# Patient Record
Sex: FEMALE | Race: WHITE | NOT HISPANIC OR LATINO
[De-identification: names, ages, dates, MRNs, and addresses within clinical notes are randomized per-mention and may not be internally consistent; named-entity substitution may affect disease eponyms.]

---

## 2021-04-09 ENCOUNTER — EXTERNAL RECORD (OUTPATIENT)
Dept: HEALTH INFORMATION MANAGEMENT | Facility: OTHER | Age: 36
End: 2021-04-09

## 2022-01-01 ENCOUNTER — EXTERNAL RECORD (OUTPATIENT)
Dept: OTHER | Age: 37
End: 2022-01-01

## 2022-06-02 ENCOUNTER — OFFICE VISIT (OUTPATIENT)
Dept: OBGYN | Age: 37
End: 2022-06-02

## 2022-06-02 VITALS
HEART RATE: 104 BPM | DIASTOLIC BLOOD PRESSURE: 78 MMHG | RESPIRATION RATE: 17 BRPM | WEIGHT: 198.41 LBS | SYSTOLIC BLOOD PRESSURE: 125 MMHG | OXYGEN SATURATION: 100 %

## 2022-06-02 DIAGNOSIS — F32.81 PMDD (PREMENSTRUAL DYSPHORIC DISORDER): Primary | ICD-10-CM

## 2022-06-02 PROCEDURE — 99204 OFFICE O/P NEW MOD 45 MIN: CPT | Performed by: OBSTETRICS & GYNECOLOGY

## 2022-06-02 RX ORDER — VENLAFAXINE 37.5 MG/1
37.5 TABLET ORAL DAILY
COMMUNITY
Start: 2022-05-04 | End: 2022-06-02 | Stop reason: SDUPTHER

## 2022-06-02 RX ORDER — VENLAFAXINE 37.5 MG/1
37.5 TABLET ORAL DAILY
Qty: 90 TABLET | Refills: 3 | Status: ON HOLD | OUTPATIENT
Start: 2022-06-02 | End: 2023-11-08

## 2022-06-02 ASSESSMENT — ENCOUNTER SYMPTOMS
ABDOMINAL DISTENTION: 0
UNEXPECTED WEIGHT CHANGE: 0
SHORTNESS OF BREATH: 1
CHILLS: 0
ACTIVITY CHANGE: 0
DIAPHORESIS: 0
FEVER: 0
FATIGUE: 1
APPETITE CHANGE: 0

## 2022-06-02 ASSESSMENT — PAIN SCALES - GENERAL: PAINLEVEL: 0

## 2022-06-27 ENCOUNTER — V-VISIT (OUTPATIENT)
Dept: FAMILY MEDICINE | Age: 37
End: 2022-06-27

## 2022-06-27 DIAGNOSIS — U07.1 COVID-19 VIRUS INFECTION: ICD-10-CM

## 2022-06-27 DIAGNOSIS — J01.90 ACUTE BACTERIAL RHINOSINUSITIS: Primary | ICD-10-CM

## 2022-06-27 DIAGNOSIS — B96.89 ACUTE BACTERIAL RHINOSINUSITIS: Primary | ICD-10-CM

## 2022-06-27 PROCEDURE — 99214 OFFICE O/P EST MOD 30 MIN: CPT | Performed by: NURSE PRACTITIONER

## 2022-06-27 RX ORDER — GUAIFENESIN 600 MG/1
600 TABLET, EXTENDED RELEASE ORAL 2 TIMES DAILY
Qty: 20 TABLET | Refills: 0 | Status: SHIPPED | COMMUNITY
Start: 2022-06-27 | End: 2022-07-07

## 2022-06-27 RX ORDER — FLUTICASONE PROPIONATE 50 MCG
2 SPRAY, SUSPENSION (ML) NASAL DAILY
Qty: 1 EACH | Refills: 0 | Status: SHIPPED | OUTPATIENT
Start: 2022-06-27 | End: 2022-09-22 | Stop reason: ALTCHOICE

## 2022-06-27 RX ORDER — DOXYCYCLINE 100 MG/1
100 CAPSULE ORAL 2 TIMES DAILY
Qty: 14 CAPSULE | Refills: 0 | Status: SHIPPED | OUTPATIENT
Start: 2022-06-27 | End: 2022-07-04

## 2022-07-19 ENCOUNTER — TELEPHONE (OUTPATIENT)
Dept: CARDIOLOGY | Age: 37
End: 2022-07-19

## 2022-07-19 DIAGNOSIS — Z87.74 HISTORY OF CONGENITAL HEART DEFECT: Primary | ICD-10-CM

## 2022-08-17 ENCOUNTER — APPOINTMENT (OUTPATIENT)
Dept: CARDIOLOGY | Age: 37
End: 2022-08-17

## 2022-09-12 ENCOUNTER — TELEPHONE (OUTPATIENT)
Dept: OBGYN | Age: 37
End: 2022-09-12

## 2022-09-22 ENCOUNTER — OFFICE VISIT (OUTPATIENT)
Dept: OBGYN | Age: 37
End: 2022-09-22

## 2022-09-22 VITALS
SYSTOLIC BLOOD PRESSURE: 130 MMHG | WEIGHT: 203.37 LBS | HEIGHT: 70 IN | DIASTOLIC BLOOD PRESSURE: 86 MMHG | RESPIRATION RATE: 17 BRPM | BODY MASS INDEX: 29.12 KG/M2 | HEART RATE: 91 BPM | OXYGEN SATURATION: 93 %

## 2022-09-22 DIAGNOSIS — N63.21 MASS OF UPPER OUTER QUADRANT OF LEFT BREAST: Primary | ICD-10-CM

## 2022-09-22 PROCEDURE — 99214 OFFICE O/P EST MOD 30 MIN: CPT | Performed by: OBSTETRICS & GYNECOLOGY

## 2022-09-22 ASSESSMENT — PAIN SCALES - GENERAL: PAINLEVEL: 0

## 2022-10-03 ENCOUNTER — HOSPITAL ENCOUNTER (OUTPATIENT)
Dept: ULTRASOUND IMAGING | Age: 37
Discharge: HOME | End: 2022-10-03
Attending: OBSTETRICS & GYNECOLOGY

## 2022-10-03 ENCOUNTER — HOSPITAL ENCOUNTER (OUTPATIENT)
Dept: MAMMOGRAPHY | Age: 37
Discharge: HOME | End: 2022-10-03
Attending: OBSTETRICS & GYNECOLOGY

## 2022-10-03 DIAGNOSIS — N63.21 MASS OF UPPER OUTER QUADRANT OF LEFT BREAST: ICD-10-CM

## 2022-10-03 PROCEDURE — G0279 TOMOSYNTHESIS, MAMMO: HCPCS

## 2022-10-03 PROCEDURE — 76642 ULTRASOUND BREAST LIMITED: CPT

## 2022-10-05 PROBLEM — N64.4 PAIN OF LEFT BREAST: Status: ACTIVE | Noted: 2022-10-05

## 2023-01-04 ENCOUNTER — HOSPITAL ENCOUNTER (OUTPATIENT)
Age: 38
Discharge: HOME | End: 2023-01-04
Attending: EMERGENCY MEDICINE
Payer: COMMERCIAL

## 2023-01-04 VITALS
SYSTOLIC BLOOD PRESSURE: 117 MMHG | TEMPERATURE: 98 F | RESPIRATION RATE: 20 BRPM | OXYGEN SATURATION: 92 % | DIASTOLIC BLOOD PRESSURE: 74 MMHG | HEART RATE: 95 BPM

## 2023-01-04 DIAGNOSIS — J01.00 ACUTE NON-RECURRENT MAXILLARY SINUSITIS: Primary | ICD-10-CM

## 2023-01-04 LAB — SARS-COV-2 RNA RESP QL NAA+PROBE: NOT DETECTED

## 2023-01-04 PROCEDURE — 99203 OFFICE O/P NEW LOW 30 MIN: CPT

## 2023-01-04 RX ORDER — FLUTICASONE PROPIONATE 50 MCG
1 SPRAY, SUSPENSION (ML) NASAL 2 TIMES DAILY
Qty: 16 G | Refills: 0 | Status: SHIPPED | OUTPATIENT
Start: 2023-01-04 | End: 2023-01-11

## 2023-01-04 RX ORDER — VENLAFAXINE 37.5 MG/1
37.5 TABLET ORAL DAILY
COMMUNITY
Start: 2022-06-02 | End: 2023-06-02

## 2023-01-04 RX ORDER — AZITHROMYCIN 250 MG/1
TABLET, FILM COATED ORAL
Qty: 6 TABLET | Refills: 0 | Status: SHIPPED | OUTPATIENT
Start: 2023-01-04 | End: 2023-01-09

## 2023-04-04 ENCOUNTER — TELEPHONE (OUTPATIENT)
Dept: OBGYN | Age: 38
End: 2023-04-04

## 2023-04-04 DIAGNOSIS — R92.8 ABNORMAL MAMMOGRAM: Primary | ICD-10-CM

## 2023-07-21 ENCOUNTER — TELEPHONE (OUTPATIENT)
Dept: OBGYN | Age: 38
End: 2023-07-21

## 2023-07-31 ENCOUNTER — TELEPHONE (OUTPATIENT)
Dept: INTERNAL MEDICINE | Age: 38
End: 2023-07-31

## 2023-08-15 ENCOUNTER — E-ADVICE (OUTPATIENT)
Dept: OBGYN | Age: 38
End: 2023-08-15

## 2023-08-27 ENCOUNTER — HOSPITAL ENCOUNTER (OUTPATIENT)
Age: 38
Discharge: HOME | End: 2023-08-27
Attending: EMERGENCY MEDICINE
Payer: COMMERCIAL

## 2023-08-27 VITALS
OXYGEN SATURATION: 96 % | DIASTOLIC BLOOD PRESSURE: 81 MMHG | RESPIRATION RATE: 20 BRPM | HEART RATE: 99 BPM | SYSTOLIC BLOOD PRESSURE: 137 MMHG | TEMPERATURE: 99 F

## 2023-08-27 DIAGNOSIS — B34.9 VIRAL SYNDROME: Primary | ICD-10-CM

## 2023-08-27 LAB
POCT INFLUENZA A: NEGATIVE
POCT INFLUENZA B: NEGATIVE
SARS-COV-2 RNA RESP QL NAA+PROBE: NOT DETECTED

## 2023-08-27 PROCEDURE — 87502 INFLUENZA DNA AMP PROBE: CPT | Performed by: EMERGENCY MEDICINE

## 2023-08-27 PROCEDURE — 99212 OFFICE O/P EST SF 10 MIN: CPT

## 2023-08-27 PROCEDURE — 99213 OFFICE O/P EST LOW 20 MIN: CPT

## 2023-08-27 NOTE — ED INITIAL ASSESSMENT (HPI)
Patient states yesterday afternoon she began to feel ill.  + fatigue, nausea, chills and headache. States iud expires this month, has a appointment for removal next week.

## 2023-08-30 ENCOUNTER — OFFICE VISIT (OUTPATIENT)
Dept: OBGYN | Age: 38
End: 2023-08-30

## 2023-08-30 VITALS
DIASTOLIC BLOOD PRESSURE: 81 MMHG | HEART RATE: 97 BPM | RESPIRATION RATE: 16 BRPM | OXYGEN SATURATION: 100 % | SYSTOLIC BLOOD PRESSURE: 127 MMHG

## 2023-08-30 DIAGNOSIS — Z30.09 GENERAL COUNSELING AND ADVICE ON FEMALE CONTRACEPTION: Primary | ICD-10-CM

## 2023-08-30 PROCEDURE — 99213 OFFICE O/P EST LOW 20 MIN: CPT | Performed by: OBSTETRICS & GYNECOLOGY

## 2023-08-30 RX ORDER — ATOMOXETINE 25 MG/1
25 CAPSULE ORAL DAILY
COMMUNITY
Start: 2023-08-17 | End: 2023-11-03 | Stop reason: ALTCHOICE

## 2023-08-30 ASSESSMENT — ENCOUNTER SYMPTOMS
NAUSEA: 0
CONSTIPATION: 0
DIARRHEA: 0
ACTIVITY CHANGE: 0
APPETITE CHANGE: 1
UNEXPECTED WEIGHT CHANGE: 1

## 2023-08-30 ASSESSMENT — PAIN SCALES - GENERAL: PAINLEVEL: 0

## 2023-09-07 ENCOUNTER — ADMINISTRATIVE DOCUMENTATION (OUTPATIENT)
Dept: CT IMAGING | Age: 38
End: 2023-09-07

## 2023-09-11 ENCOUNTER — E-ADVICE (OUTPATIENT)
Dept: OTHER | Age: 38
End: 2023-09-11

## 2023-09-21 ENCOUNTER — OFFICE VISIT (OUTPATIENT)
Dept: OBGYN | Age: 38
End: 2023-09-21

## 2023-09-21 VITALS
DIASTOLIC BLOOD PRESSURE: 79 MMHG | BODY MASS INDEX: 29.32 KG/M2 | WEIGHT: 204.37 LBS | RESPIRATION RATE: 16 BRPM | OXYGEN SATURATION: 98 % | SYSTOLIC BLOOD PRESSURE: 125 MMHG | HEART RATE: 108 BPM

## 2023-09-21 DIAGNOSIS — Z12.4 CERVICAL CANCER SCREENING: ICD-10-CM

## 2023-09-21 DIAGNOSIS — Z30.430 ENCOUNTER FOR INSERTION OF MIRENA IUD: ICD-10-CM

## 2023-09-21 DIAGNOSIS — T83.39XA RETAINED INTRAUTERINE CONTRACEPTIVE DEVICE (IUD): ICD-10-CM

## 2023-09-21 DIAGNOSIS — Z30.433 ENCOUNTER FOR REMOVAL AND REINSERTION OF IUD: Primary | ICD-10-CM

## 2023-09-21 PROCEDURE — 87624 HPV HI-RISK TYP POOLED RSLT: CPT | Performed by: INTERNAL MEDICINE

## 2023-09-21 PROCEDURE — 58301 REMOVE INTRAUTERINE DEVICE: CPT | Performed by: OBSTETRICS & GYNECOLOGY

## 2023-09-21 PROCEDURE — 99213 OFFICE O/P EST LOW 20 MIN: CPT | Performed by: OBSTETRICS & GYNECOLOGY

## 2023-09-21 PROCEDURE — 76830 TRANSVAGINAL US NON-OB: CPT | Performed by: OBSTETRICS & GYNECOLOGY

## 2023-09-21 PROCEDURE — 88175 CYTOPATH C/V AUTO FLUID REDO: CPT | Performed by: INTERNAL MEDICINE

## 2023-09-21 ASSESSMENT — PATIENT HEALTH QUESTIONNAIRE - PHQ9
SUM OF ALL RESPONSES TO PHQ9 QUESTIONS 1 AND 2: 0
CLINICAL INTERPRETATION OF PHQ2 SCORE: NO FURTHER SCREENING NEEDED
1. LITTLE INTEREST OR PLEASURE IN DOING THINGS: NOT AT ALL
SUM OF ALL RESPONSES TO PHQ9 QUESTIONS 1 AND 2: 0
2. FEELING DOWN, DEPRESSED OR HOPELESS: NOT AT ALL

## 2023-09-21 ASSESSMENT — PAIN SCALES - GENERAL: PAINLEVEL: 0

## 2023-09-25 ENCOUNTER — PREP FOR CASE (OUTPATIENT)
Dept: OBGYN | Age: 38
End: 2023-09-25

## 2023-09-25 DIAGNOSIS — B97.7 HIGH RISK HPV INFECTION: ICD-10-CM

## 2023-09-25 DIAGNOSIS — T83.39XA RETAINED INTRAUTERINE CONTRACEPTIVE DEVICE (IUD): Primary | ICD-10-CM

## 2023-09-26 LAB
CASE RPRT: NORMAL
CLINICAL INFO: NORMAL
CYTOLOGY CVX/VAG STUDY: NORMAL
HPV16+18+45 E6+E7MRNA CVX NAA+PROBE: POSITIVE
Lab: ABNORMAL
PAP EDUCATIONAL NOTE: NORMAL
SPECIMEN ADEQUACY: NORMAL

## 2023-10-05 ENCOUNTER — TELEPHONE (OUTPATIENT)
Dept: OBGYN | Age: 38
End: 2023-10-05

## 2023-10-09 ENCOUNTER — TELEPHONE (OUTPATIENT)
Dept: ULTRASOUND IMAGING | Age: 38
End: 2023-10-09

## 2023-10-25 ENCOUNTER — E-ADVICE (OUTPATIENT)
Dept: OBGYN | Age: 38
End: 2023-10-25

## 2023-10-26 ENCOUNTER — APPOINTMENT (OUTPATIENT)
Dept: GENERAL RADIOLOGY | Age: 38
End: 2023-10-26
Attending: EMERGENCY MEDICINE
Payer: COMMERCIAL

## 2023-10-26 ENCOUNTER — HOSPITAL ENCOUNTER (OUTPATIENT)
Age: 38
Discharge: HOME | End: 2023-10-26
Attending: EMERGENCY MEDICINE
Payer: COMMERCIAL

## 2023-10-26 VITALS
TEMPERATURE: 99 F | SYSTOLIC BLOOD PRESSURE: 117 MMHG | RESPIRATION RATE: 20 BRPM | DIASTOLIC BLOOD PRESSURE: 90 MMHG | HEART RATE: 110 BPM | OXYGEN SATURATION: 95 %

## 2023-10-26 DIAGNOSIS — U07.1 COVID-19: Primary | ICD-10-CM

## 2023-10-26 LAB — SARS-COV-2 RNA RESP QL NAA+PROBE: DETECTED

## 2023-10-26 PROCEDURE — 99213 OFFICE O/P EST LOW 20 MIN: CPT

## 2023-10-26 PROCEDURE — 71046 X-RAY EXAM CHEST 2 VIEWS: CPT | Performed by: EMERGENCY MEDICINE

## 2023-10-26 PROCEDURE — 99214 OFFICE O/P EST MOD 30 MIN: CPT

## 2023-11-03 ENCOUNTER — OFFICE VISIT (OUTPATIENT)
Dept: OBGYN | Age: 38
End: 2023-11-03

## 2023-11-03 VITALS
WEIGHT: 208.89 LBS | DIASTOLIC BLOOD PRESSURE: 76 MMHG | HEART RATE: 92 BPM | SYSTOLIC BLOOD PRESSURE: 113 MMHG | RESPIRATION RATE: 16 BRPM | OXYGEN SATURATION: 100 % | BODY MASS INDEX: 29.97 KG/M2

## 2023-11-03 DIAGNOSIS — B97.7 HIGH RISK HPV INFECTION: ICD-10-CM

## 2023-11-03 DIAGNOSIS — Z01.818 PRE-OPERATIVE EXAM: Primary | ICD-10-CM

## 2023-11-03 DIAGNOSIS — T83.39XA RETAINED INTRAUTERINE CONTRACEPTIVE DEVICE (IUD): ICD-10-CM

## 2023-11-03 PROCEDURE — 99214 OFFICE O/P EST MOD 30 MIN: CPT | Performed by: OBSTETRICS & GYNECOLOGY

## 2023-11-03 ASSESSMENT — PAIN SCALES - GENERAL: PAINLEVEL: 0

## 2023-11-06 ENCOUNTER — ANESTHESIA EVENT (OUTPATIENT)
Dept: SURGERY | Age: 38
End: 2023-11-06

## 2023-11-06 RX ORDER — METRONIDAZOLE 500 MG/100ML
500 INJECTION, SOLUTION INTRAVENOUS
Status: CANCELLED | OUTPATIENT
Start: 2023-11-06

## 2023-11-06 RX ORDER — SODIUM CHLORIDE, SODIUM LACTATE, POTASSIUM CHLORIDE, CALCIUM CHLORIDE 600; 310; 30; 20 MG/100ML; MG/100ML; MG/100ML; MG/100ML
INJECTION, SOLUTION INTRAVENOUS CONTINUOUS
Status: CANCELLED | OUTPATIENT
Start: 2023-11-06

## 2023-11-06 RX ORDER — ATOMOXETINE 25 MG/1
25 CAPSULE ORAL DAILY
COMMUNITY

## 2023-11-06 RX ORDER — ACETAMINOPHEN 325 MG/1
650 TABLET ORAL ONCE
Status: CANCELLED | OUTPATIENT
Start: 2023-11-06 | End: 2023-11-06

## 2023-11-06 ASSESSMENT — ACTIVITIES OF DAILY LIVING (ADL)
RECENT_DECLINE_ADL: NO
NEEDS_ASSIST: NO
ADL_BEFORE_ADMISSION: INDEPENDENT
SENSORY_SUPPORT_DEVICES: EYEGLASSES
ADL_SHORT_OF_BREATH: NO
ADL_SCORE: 12

## 2023-11-06 ASSESSMENT — COGNITIVE AND FUNCTIONAL STATUS - GENERAL
ARE YOU DEAF OR DO YOU HAVE SERIOUS DIFFICULTY  HEARING: NO
ARE YOU BLIND OR DO YOU HAVE SERIOUS DIFFICULTY SEEING, EVEN WHEN WEARING GLASSES: NO

## 2023-11-08 ENCOUNTER — ANESTHESIA (OUTPATIENT)
Dept: SURGERY | Age: 38
End: 2023-11-08

## 2023-11-08 ENCOUNTER — HOSPITAL ENCOUNTER (OUTPATIENT)
Age: 38
Discharge: HOME | End: 2023-11-08
Attending: OBSTETRICS & GYNECOLOGY | Admitting: OBSTETRICS & GYNECOLOGY

## 2023-11-08 DIAGNOSIS — T83.39XA RETAINED INTRAUTERINE CONTRACEPTIVE DEVICE (IUD): ICD-10-CM

## 2023-11-08 DIAGNOSIS — F32.81 PMDD (PREMENSTRUAL DYSPHORIC DISORDER): ICD-10-CM

## 2023-11-08 LAB
B-HCG UR QL: NEGATIVE
DEPRECATED RDW RBC: 43.8 FL (ref 39–50)
ERYTHROCYTE [DISTWIDTH] IN BLOOD: 12.9 % (ref 11–15)
HCT VFR BLD CALC: 43.1 % (ref 36–46.5)
HGB BLD-MCNC: 14.6 G/DL (ref 12–15.5)
INTERNAL PROCEDURAL CONTROLS ACCEPTABLE: YES
MCH RBC QN AUTO: 31.7 PG (ref 26–34)
MCHC RBC AUTO-ENTMCNC: 33.9 G/DL (ref 32–36.5)
MCV RBC AUTO: 93.5 FL (ref 78–100)
NRBC BLD MANUAL-RTO: 0 /100 WBC
PLATELET # BLD AUTO: 241 K/MCL (ref 140–450)
RBC # BLD: 4.61 MIL/MCL (ref 4–5.2)
TEST LOT EXPIRATION DATE: NORMAL
TEST LOT NUMBER: NORMAL
WBC # BLD: 6 K/MCL (ref 4.2–11)

## 2023-11-08 PROCEDURE — 10006023 HB SUPPLY 272: Performed by: OBSTETRICS & GYNECOLOGY

## 2023-11-08 PROCEDURE — 10002801 HB RX 250 W/O HCPCS: Performed by: OBSTETRICS & GYNECOLOGY

## 2023-11-08 PROCEDURE — 10002801 HB RX 250 W/O HCPCS

## 2023-11-08 PROCEDURE — 13000036 HB COMPLEX  CASE S/U + 1ST 15 MIN: Performed by: OBSTETRICS & GYNECOLOGY

## 2023-11-08 PROCEDURE — 10004452 HB PACU ADDL 30 MINUTES: Performed by: OBSTETRICS & GYNECOLOGY

## 2023-11-08 PROCEDURE — 10002803 HB RX 637

## 2023-11-08 PROCEDURE — 88305 TISSUE EXAM BY PATHOLOGIST: CPT | Performed by: OBSTETRICS & GYNECOLOGY

## 2023-11-08 PROCEDURE — 13000007 HB ANESTHESIA MAC EA ADD MINUTE: Performed by: OBSTETRICS & GYNECOLOGY

## 2023-11-08 PROCEDURE — 58300 INSERT INTRAUTERINE DEVICE: CPT | Performed by: OBSTETRICS & GYNECOLOGY

## 2023-11-08 PROCEDURE — 13000006 HB ANESTHESIA MAC S/U + 1ST 15 MIN: Performed by: OBSTETRICS & GYNECOLOGY

## 2023-11-08 PROCEDURE — 58562 HYSTEROSCOPY REMOVE FB: CPT | Performed by: OBSTETRICS & GYNECOLOGY

## 2023-11-08 PROCEDURE — 85027 COMPLETE CBC AUTOMATED: CPT | Performed by: OBSTETRICS & GYNECOLOGY

## 2023-11-08 PROCEDURE — 57454 BX/CURETT OF CERVIX W/SCOPE: CPT | Performed by: OBSTETRICS & GYNECOLOGY

## 2023-11-08 PROCEDURE — 13000037 HB COMPLEX CASE EACH ADD MINUTE: Performed by: OBSTETRICS & GYNECOLOGY

## 2023-11-08 PROCEDURE — 10004451 HB PACU RECOVERY 1ST 30 MINUTES: Performed by: OBSTETRICS & GYNECOLOGY

## 2023-11-08 PROCEDURE — 81025 URINE PREGNANCY TEST: CPT | Performed by: OBSTETRICS & GYNECOLOGY

## 2023-11-08 PROCEDURE — 13000001 HB PHASE II RECOVERY EA 30 MINUTES: Performed by: OBSTETRICS & GYNECOLOGY

## 2023-11-08 PROCEDURE — 10002800 HB RX 250 W HCPCS

## 2023-11-08 PROCEDURE — 10004651 HB RX, NO CHARGE ITEM: Performed by: OBSTETRICS & GYNECOLOGY

## 2023-11-08 PROCEDURE — 10002803 HB RX 637: Performed by: OBSTETRICS & GYNECOLOGY

## 2023-11-08 PROCEDURE — 10002800 HB RX 250 W HCPCS: Performed by: OBSTETRICS & GYNECOLOGY

## 2023-11-08 PROCEDURE — 10002807 HB RX 258

## 2023-11-08 RX ORDER — PROCHLORPERAZINE EDISYLATE 5 MG/ML
5 INJECTION INTRAMUSCULAR; INTRAVENOUS EVERY 4 HOURS PRN
Status: DISCONTINUED | OUTPATIENT
Start: 2023-11-08 | End: 2023-11-08 | Stop reason: HOSPADM

## 2023-11-08 RX ORDER — CIPROFLOXACIN 2 MG/ML
INJECTION, SOLUTION INTRAVENOUS PRN
Status: DISCONTINUED | OUTPATIENT
Start: 2023-11-08 | End: 2023-11-08

## 2023-11-08 RX ORDER — ALBUTEROL SULFATE 2.5 MG/3ML
2.5 SOLUTION RESPIRATORY (INHALATION)
Status: DISCONTINUED | OUTPATIENT
Start: 2023-11-08 | End: 2023-11-08 | Stop reason: HOSPADM

## 2023-11-08 RX ORDER — HYDROCODONE BITARTRATE AND ACETAMINOPHEN 5; 325 MG/1; MG/1
1 TABLET ORAL
Status: DISCONTINUED | OUTPATIENT
Start: 2023-11-08 | End: 2023-11-08 | Stop reason: HOSPADM

## 2023-11-08 RX ORDER — FAMOTIDINE 20 MG/1
20 TABLET, FILM COATED ORAL
Status: COMPLETED | OUTPATIENT
Start: 2023-11-08 | End: 2023-11-08

## 2023-11-08 RX ORDER — HYDRALAZINE HYDROCHLORIDE 20 MG/ML
5 INJECTION INTRAMUSCULAR; INTRAVENOUS EVERY 10 MIN PRN
Status: DISCONTINUED | OUTPATIENT
Start: 2023-11-08 | End: 2023-11-08 | Stop reason: HOSPADM

## 2023-11-08 RX ORDER — PROMETHAZINE HYDROCHLORIDE 25 MG/1
25 TABLET ORAL EVERY 6 HOURS PRN
Status: DISCONTINUED | OUTPATIENT
Start: 2023-11-08 | End: 2023-11-08 | Stop reason: HOSPADM

## 2023-11-08 RX ORDER — MIDAZOLAM HYDROCHLORIDE 1 MG/ML
INJECTION, SOLUTION INTRAMUSCULAR; INTRAVENOUS PRN
Status: DISCONTINUED | OUTPATIENT
Start: 2023-11-08 | End: 2023-11-08

## 2023-11-08 RX ORDER — DROPERIDOL 2.5 MG/ML
0.62 INJECTION, SOLUTION INTRAMUSCULAR; INTRAVENOUS
Status: DISCONTINUED | OUTPATIENT
Start: 2023-11-08 | End: 2023-11-08 | Stop reason: HOSPADM

## 2023-11-08 RX ORDER — CIPROFLOXACIN 2 MG/ML
400 INJECTION, SOLUTION INTRAVENOUS
Status: DISCONTINUED | OUTPATIENT
Start: 2023-11-08 | End: 2023-11-08 | Stop reason: HOSPADM

## 2023-11-08 RX ORDER — SODIUM CHLORIDE, SODIUM LACTATE, POTASSIUM CHLORIDE, CALCIUM CHLORIDE 600; 310; 30; 20 MG/100ML; MG/100ML; MG/100ML; MG/100ML
INJECTION, SOLUTION INTRAVENOUS CONTINUOUS
Status: DISCONTINUED | OUTPATIENT
Start: 2023-11-08 | End: 2023-11-08 | Stop reason: HOSPADM

## 2023-11-08 RX ORDER — ACETAMINOPHEN 325 MG/1
650 TABLET ORAL ONCE
Status: COMPLETED | OUTPATIENT
Start: 2023-11-08 | End: 2023-11-08

## 2023-11-08 RX ORDER — ONDANSETRON 2 MG/ML
4 INJECTION INTRAMUSCULAR; INTRAVENOUS
Status: DISCONTINUED | OUTPATIENT
Start: 2023-11-08 | End: 2023-11-08 | Stop reason: HOSPADM

## 2023-11-08 RX ORDER — VENLAFAXINE 37.5 MG/1
37.5 TABLET ORAL DAILY
Qty: 30 TABLET | Refills: 0 | Status: SHIPPED | OUTPATIENT
Start: 2023-11-08 | End: 2024-11-07

## 2023-11-08 RX ORDER — OXYCODONE HYDROCHLORIDE 5 MG/1
5 TABLET ORAL
Status: DISCONTINUED | OUTPATIENT
Start: 2023-11-08 | End: 2023-11-08 | Stop reason: HOSPADM

## 2023-11-08 RX ORDER — ACETAMINOPHEN 325 MG/1
650 TABLET ORAL ONCE
Status: DISCONTINUED | OUTPATIENT
Start: 2023-11-08 | End: 2023-11-08 | Stop reason: HOSPADM

## 2023-11-08 RX ORDER — PROPOFOL 10 MG/ML
INJECTION, EMULSION INTRAVENOUS PRN
Status: DISCONTINUED | OUTPATIENT
Start: 2023-11-08 | End: 2023-11-08

## 2023-11-08 RX ORDER — SODIUM CHLORIDE, SODIUM LACTATE, POTASSIUM CHLORIDE, CALCIUM CHLORIDE 600; 310; 30; 20 MG/100ML; MG/100ML; MG/100ML; MG/100ML
INJECTION, SOLUTION INTRAVENOUS CONTINUOUS PRN
Status: DISCONTINUED | OUTPATIENT
Start: 2023-11-08 | End: 2023-11-08

## 2023-11-08 RX ADMIN — FAMOTIDINE 20 MG: 20 TABLET, FILM COATED ORAL at 09:35

## 2023-11-08 RX ADMIN — CIPROFLOXACIN 400 MG: 400 INJECTION, SOLUTION INTRAVENOUS at 11:21

## 2023-11-08 RX ADMIN — PROPOFOL INJECTABLE EMULSION 120 MCG/KG/MIN: 10 INJECTION, EMULSION INTRAVENOUS at 10:36

## 2023-11-08 RX ADMIN — SODIUM CHLORIDE, POTASSIUM CHLORIDE, SODIUM LACTATE AND CALCIUM CHLORIDE: 600; 310; 30; 20 INJECTION, SOLUTION INTRAVENOUS at 10:31

## 2023-11-08 RX ADMIN — MIDAZOLAM HYDROCHLORIDE 2 MG: 1 INJECTION, SOLUTION INTRAMUSCULAR; INTRAVENOUS at 10:32

## 2023-11-08 RX ADMIN — FENTANYL CITRATE 50 MCG: 50 INJECTION INTRAMUSCULAR; INTRAVENOUS at 10:32

## 2023-11-08 RX ADMIN — KETOROLAC TROMETHAMINE 30 MG: 30 INJECTION, SOLUTION INTRAMUSCULAR at 11:33

## 2023-11-08 RX ADMIN — PROPOFOL 50 MG: 10 INJECTION, EMULSION INTRAVENOUS at 11:23

## 2023-11-08 RX ADMIN — METRONIDAZOLE 500 MG: 500 INJECTION, SOLUTION INTRAVENOUS at 10:46

## 2023-11-08 RX ADMIN — ACETAMINOPHEN 650 MG: 325 TABLET ORAL at 09:35

## 2023-11-08 RX ADMIN — PROPOFOL 75 MG: 10 INJECTION, EMULSION INTRAVENOUS at 10:36

## 2023-11-08 RX ADMIN — FENTANYL CITRATE 50 MCG: 50 INJECTION INTRAMUSCULAR; INTRAVENOUS at 10:39

## 2023-11-08 ASSESSMENT — PAIN SCALES - GENERAL
PAINLEVEL_OUTOF10: 3
PAINLEVEL_OUTOF10: 2

## 2023-11-09 VITALS
RESPIRATION RATE: 16 BRPM | BODY MASS INDEX: 29.67 KG/M2 | DIASTOLIC BLOOD PRESSURE: 78 MMHG | WEIGHT: 207.23 LBS | HEIGHT: 70 IN | HEART RATE: 80 BPM | OXYGEN SATURATION: 96 % | SYSTOLIC BLOOD PRESSURE: 117 MMHG | TEMPERATURE: 96.8 F

## 2023-11-10 LAB
ASR DISCLAIMER: NORMAL
CASE RPRT: NORMAL
CLINICAL INFO: NORMAL
PATH REPORT.FINAL DX SPEC: NORMAL
PATH REPORT.GROSS SPEC: NORMAL

## 2023-11-15 ENCOUNTER — OFFICE VISIT (OUTPATIENT)
Dept: OBGYN | Age: 38
End: 2023-11-15

## 2023-11-15 DIAGNOSIS — Z98.890 POST-OPERATIVE STATE: Primary | ICD-10-CM

## 2023-11-15 PROCEDURE — 99213 OFFICE O/P EST LOW 20 MIN: CPT | Performed by: OBSTETRICS & GYNECOLOGY

## 2023-11-27 ENCOUNTER — E-ADVICE (OUTPATIENT)
Dept: OBGYN | Age: 38
End: 2023-11-27

## 2023-12-20 ENCOUNTER — APPOINTMENT (OUTPATIENT)
Dept: OBGYN | Age: 38
End: 2023-12-20

## 2024-01-02 ENCOUNTER — TELEPHONE (OUTPATIENT)
Dept: OBGYN | Age: 39
End: 2024-01-02

## 2024-01-19 ENCOUNTER — APPOINTMENT (OUTPATIENT)
Dept: OBGYN | Age: 39
End: 2024-01-19

## 2024-01-19 VITALS
SYSTOLIC BLOOD PRESSURE: 130 MMHG | RESPIRATION RATE: 16 BRPM | DIASTOLIC BLOOD PRESSURE: 83 MMHG | OXYGEN SATURATION: 98 % | HEART RATE: 98 BPM | BODY MASS INDEX: 29.84 KG/M2 | WEIGHT: 208 LBS

## 2024-01-19 DIAGNOSIS — Z30.431 IUD CHECK UP: Primary | ICD-10-CM

## 2024-01-19 PROCEDURE — 76830 TRANSVAGINAL US NON-OB: CPT | Performed by: OBSTETRICS & GYNECOLOGY

## 2024-01-19 PROCEDURE — 99213 OFFICE O/P EST LOW 20 MIN: CPT | Performed by: OBSTETRICS & GYNECOLOGY

## 2024-01-19 ASSESSMENT — PAIN SCALES - GENERAL: PAINLEVEL: 0

## 2024-03-15 ENCOUNTER — HOSPITAL ENCOUNTER (OUTPATIENT)
Age: 39
Discharge: HOME | End: 2024-03-15
Attending: EMERGENCY MEDICINE
Payer: COMMERCIAL

## 2024-03-15 VITALS
OXYGEN SATURATION: 95 % | HEART RATE: 105 BPM | SYSTOLIC BLOOD PRESSURE: 121 MMHG | TEMPERATURE: 98 F | DIASTOLIC BLOOD PRESSURE: 75 MMHG | RESPIRATION RATE: 24 BRPM

## 2024-03-15 DIAGNOSIS — B34.9 VIRAL SYNDROME: Primary | ICD-10-CM

## 2024-03-15 LAB
S PYO AG THROAT QL IA.RAPID: NEGATIVE
SARS-COV-2 RNA RESP QL NAA+PROBE: NOT DETECTED

## 2024-03-15 PROCEDURE — 87651 STREP A DNA AMP PROBE: CPT | Performed by: EMERGENCY MEDICINE

## 2024-03-15 PROCEDURE — 99213 OFFICE O/P EST LOW 20 MIN: CPT

## 2024-03-15 RX ORDER — AZITHROMYCIN 250 MG/1
TABLET, FILM COATED ORAL
Qty: 6 TABLET | Refills: 0 | Status: SHIPPED | OUTPATIENT
Start: 2024-03-15 | End: 2024-03-20

## 2024-03-15 NOTE — ED INITIAL ASSESSMENT (HPI)
Patient reports sore throat and cough for the last 3 days.  States she feels like she may have a head cold as well.  + laryngitis exposure.  + chills.  Denies fevers.

## 2024-03-15 NOTE — ED PROVIDER NOTES
Patient Seen in: [facility] [place]      History     Chief Complaint   Patient presents with    Sore Throat     Stated Complaint: cold/flu    Subjective:   HPI    The patient is a 38-year-old female with past history of congenital heart disease, status post repair, who presents now with cough, congestion, chills.  The patient states the symptoms started approximately 3 days ago.  The patient has had a sore throat associated with nasal congestion and cough over the last 3 days.  Patient denies any chest pain or shortness of breath.  The patient has not noted a fever but has had some chills.    Objective:   History reviewed. No pertinent past medical history.           History reviewed. No pertinent surgical history.             No pertinent social history.            Review of Systems    Positive for stated complaint: cold/flu  Other systems are as noted in HPI.  Constitutional and vital signs reviewed.      All other systems reviewed and negative except as noted above.    Physical Exam     ED Triage Vitals [03/15/24 1304]   /75   Pulse 105   Resp 24   Temp 98 °F (36.7 °C)   Temp src Temporal   SpO2 95 %   O2 Device None (Room air)       Current:/75   Pulse 105   Temp 98 °F (36.7 °C) (Temporal)   Resp 24   SpO2 95%         Physical Exam    Constitutional: Well-developed well-nourished in no acute distress  Head: Normocephalic, no swelling or tenderness  Eyes: Nonicteric sclera, no conjunctival injection  ENT: TMs are clear and flat bilaterally.  There is mild posterior pharyngeal erythema  Chest: Clear to auscultation, no tenderness  Cardiovascular: Regular rate and rhythm without murmur  Abdomen: Soft, nontender and nondistended  Neurologic: Patient is awake, alert and oriented ×3.  The patient's motor strength is 5 out of 5 and symmetric in the upper and lower extremities bilaterally  Extremities: No focal swelling or tenderness  Skin: No pallor, no redness or warmth to the touch      ED  Course     Labs Reviewed   RAPID STREP A - Normal   RAPID SARS-COV-2 BY PCR - Normal        Patient's negative COVID, negative strep were reviewed with the patient.  Probable viral etiology of the patient's symptoms.  Patient states that because of her heart history, she has typically been covered with antibiotics when she has symptoms like this.  The risk and benefits of antibiotics were discussed with the patient.  She prefers to have a prescription in case her symptoms worsen.  Will provide with Zithromax prescription though recommend with holding initiating for another 48 to 72 hours to see if symptoms will resolve on their own.  The patient voices an understanding.            MDM      Viral URI versus strep throat versus COVID                                   Medical Decision Making      Disposition and Plan     Clinical Impression:  1. Viral syndrome         Disposition:  Discharge  3/15/2024  1:23 pm    Follow-up:  Johanny Pyle MD  [address]  [place] [address]  [phone]      Or your primary MD as needed          Medications Prescribed:  Discharge Medication List as of 3/15/2024  1:24 PM        START taking these medications    Details   azithromycin (ZITHROMAX Z-KATELYNN) 250 MG Oral Tab 500 mg once followed by 250 mg daily x 4 days, Print, Disp-6 tablet, R-0

## 2024-09-17 ENCOUNTER — OFFICE VISIT (OUTPATIENT)
Dept: INTERNAL MEDICINE CLINIC | Facility: CLINIC | Age: 39
End: 2024-09-17
Payer: COMMERCIAL

## 2024-09-17 VITALS
DIASTOLIC BLOOD PRESSURE: 70 MMHG | HEART RATE: 77 BPM | SYSTOLIC BLOOD PRESSURE: 110 MMHG | HEIGHT: 70 IN | OXYGEN SATURATION: 100 % | BODY MASS INDEX: 31.07 KG/M2 | WEIGHT: 217 LBS

## 2024-09-17 DIAGNOSIS — K90.0: ICD-10-CM

## 2024-09-17 DIAGNOSIS — R42 DIZZINESS: ICD-10-CM

## 2024-09-17 DIAGNOSIS — N92.6 IRREGULAR PERIODS: ICD-10-CM

## 2024-09-17 DIAGNOSIS — J01.90 ACUTE SINUSITIS, RECURRENCE NOT SPECIFIED, UNSPECIFIED LOCATION: Primary | ICD-10-CM

## 2024-09-17 DIAGNOSIS — J06.9 URTI (ACUTE UPPER RESPIRATORY INFECTION): ICD-10-CM

## 2024-09-17 DIAGNOSIS — Z83.49 FAMILY HISTORY OF THYROID DISEASE: ICD-10-CM

## 2024-09-17 DIAGNOSIS — J02.9 SORE THROAT: ICD-10-CM

## 2024-09-17 DIAGNOSIS — L70.9 ACNE, UNSPECIFIED ACNE TYPE: ICD-10-CM

## 2024-09-17 DIAGNOSIS — R87.810 CERVICAL HIGH RISK HPV (HUMAN PAPILLOMAVIRUS) TEST POSITIVE: ICD-10-CM

## 2024-09-17 PROBLEM — B97.7 HIGH RISK HPV INFECTION: Status: ACTIVE | Noted: 2023-11-03

## 2024-09-17 PROBLEM — Q21.10 ATRIAL SEPTAL DEFECT (HCC): Status: ACTIVE | Noted: 2017-07-18

## 2024-09-17 PROBLEM — N64.4 PAIN OF LEFT BREAST: Status: ACTIVE | Noted: 2022-10-05

## 2024-09-17 LAB
COVID19 BINAX NOW ANTIGEN: NOT DETECTED
OPERATOR ID: NORMAL

## 2024-09-17 PROCEDURE — 3078F DIAST BP <80 MM HG: CPT | Performed by: INTERNAL MEDICINE

## 2024-09-17 PROCEDURE — 99204 OFFICE O/P NEW MOD 45 MIN: CPT | Performed by: INTERNAL MEDICINE

## 2024-09-17 PROCEDURE — 3074F SYST BP LT 130 MM HG: CPT | Performed by: INTERNAL MEDICINE

## 2024-09-17 PROCEDURE — 3008F BODY MASS INDEX DOCD: CPT | Performed by: INTERNAL MEDICINE

## 2024-09-17 RX ORDER — AZITHROMYCIN 250 MG/1
TABLET, FILM COATED ORAL
Qty: 6 TABLET | Refills: 0 | Status: SHIPPED | OUTPATIENT
Start: 2024-09-17 | End: 2024-09-21

## 2024-09-17 RX ORDER — METHYLPREDNISOLONE 4 MG
TABLET, DOSE PACK ORAL
Qty: 21 EACH | Refills: 0 | Status: SHIPPED | OUTPATIENT
Start: 2024-09-17

## 2024-09-17 RX ORDER — ATOMOXETINE 25 MG/1
25 CAPSULE ORAL DAILY
COMMUNITY

## 2024-09-17 RX ORDER — VENLAFAXINE 37.5 MG/1
37.5 TABLET ORAL DAILY
COMMUNITY

## 2024-09-17 NOTE — PROGRESS NOTES
Evangelina Timmons is a 38 year old female.    Chief complaint: establish care       HPI:     Evangelina Timmons is a 38 year old pleasant female who presents to [facility]     She was recently diagnosed with ADHD       She breaks out   On her thigh   Abdomen and back   On and off   No pain   No itching           Up to date with pap smear         Had IUD replaced in nov 2023   Now she will gets  a period irregular   Inconsistent period flow   Usually 3 days   She odes have Mirena IUD   Improving symptoms compared to when she first had it   Does have hot flashes   Heat intolerance   No known family history of premature menopause   No chest pain   No sob             Was on mirtazapine  Wellbutrin   Buspar   Effexor   Sertraline   Had a reaction / didn't tolerate them   She has an appt with the psychiatrist next week   She is seeing a therapist         Dizziness at times   Headache regularly   Does clench her jaw   Frontal headache       Would like to be tested for celiac   Feels better when cuts out gluten       URTI   Sinus congestion   Took 1 allergy medication past Saturday   Sore throat   Saturday didn't feel good but did a lot of stuff in her house   Sunday had fever and sore throat  Niece had thrush   Headaches are random   She drinks 32 oz if water per day         Family history of : mom thyroid disease   No smoking never been a smoker   Alcohol : a glass of wine may be twice per week                   Current Outpatient Medications   Medication Sig Dispense Refill    atomoxetine 25 MG Oral Cap Take 1 capsule (25 mg total) by mouth daily. (Patient not taking: Reported on 9/17/2024)      sertraline 50 MG Oral Tab Take 1 tablet (50 mg total) by mouth daily. (Patient not taking: Reported on 9/17/2024)      venlafaxine 37.5 MG Oral Tab Take 1 tablet (37.5 mg total) by mouth daily. (Patient not taking: Reported on 9/17/2024)      Levonorgestrel 20 MCG/DAY Intrauterine IUD 1 each by Intrauterine route  continuous. (Patient not taking: Reported on 9/17/2024)        No past medical history on file.  No past surgical history on file.        No family history on file.  There is no problem list on file for this patient.      REVIEW OF SYSTEMS:   A comprehensive 10 point review of systems was completed.  Pertinent positives and negatives noted in the the HPI            EXAM:   /70   Pulse 77   Ht 5' 10\" (1.778 m)   Wt 217 lb (98.4 kg)   SpO2 100%   BMI 31.14 kg/m²   GENERAL: well developed, well nourished,in no apparent distress  SKIN:acne / papular rash over abdomen and back   HEENT: fluids behind the TM   Congested   LUNGS: clear to auscultation  CARDIO: RRR without murmur              Orders Placed This Encounter    atomoxetine 25 MG Oral Cap     Sig: Take 1 capsule (25 mg total) by mouth daily.    sertraline 50 MG Oral Tab     Sig: Take 1 tablet (50 mg total) by mouth daily.    venlafaxine 37.5 MG Oral Tab     Sig: Take 1 tablet (37.5 mg total) by mouth daily.     No results found.         ASSESSMENT AND PLAN:     1. Irregular periods  Testosterone level   TSH   TPO   - CBC With Differential With Platelet; Future  - Comp Metabolic Panel (14); Future  - Lipid Panel; Future  - TSH W Reflex To Free T4; Future  - Vitamin D; Future  - Hemoglobin A1C; Future  - FSH [E]; Future  - Allergy Region 8; Future  - Testosterone,Total and Weakly Bound w/ SHBG; Future  - Dehydroepiandrosterone Sulfate [E]; Future  - CELIAC DISEASE SCREEN Reflex; Future  - POC COVID19 BinaxNOW Antigen  - OBG Referral - In Network  - Thyroid Peroxidase (TPO) AB [E]; Future    2. Sore throat  Covid POC negative   Zpcak and medrol dose shruthi     - CBC With Differential With Platelet; Future  - Comp Metabolic Panel (14); Future  - Lipid Panel; Future  - TSH W Reflex To Free T4; Future  - Vitamin D; Future  - Hemoglobin A1C; Future  - FSH [E]; Future  - Allergy Region 8; Future  - Testosterone,Total and Weakly Bound w/ SHBG; Future  -  Dehydroepiandrosterone Sulfate [E]; Future  - CELIAC DISEASE SCREEN Reflex; Future  - POC COVID19 BinaxNOW Antigen  - OBG Referral - In Network  - Thyroid Peroxidase (TPO) AB [E]; Future    3. Dizziness  Cbc , cmp, tsh     - CELIAC DISEASE SCREEN Reflex; Future  - POC COVID19 BinaxNOW Antigen  - Thyroid Peroxidase (TPO) AB [E]; Future    4. Cervical high risk HPV (human papillomavirus) test positive  Advised to follow up with [facility]   - CELIAC DISEASE SCREEN Reflex; Future  - POC COVID19 BinaxNOW Antigen  - Thyroid Peroxidase (TPO) AB [E]; Future    5. URTI (acute upper respiratory infection)  Poc covid negative   Advised to start zpack and medrol dose pack for rhino sinusitis    Allergy testing   - atomoxetine 25 MG Oral Cap; Take 1 capsule (25 mg total) by mouth daily. (Patient not taking: Reported on 9/17/2024)  - sertraline 50 MG Oral Tab; Take 1 tablet (50 mg total) by mouth daily. (Patient not taking: Reported on 9/17/2024)  - venlafaxine 37.5 MG Oral Tab; Take 1 tablet (37.5 mg total) by mouth daily. (Patient not taking: Reported on 9/17/2024)  - CBC With Differential With Platelet; Future  - Comp Metabolic Panel (14); Future  - Lipid Panel; Future  - TSH W Reflex To Free T4; Future  - Vitamin D; Future  - Hemoglobin A1C; Future  - FSH [E]; Future  - Allergy Region 8; Future  - Testosterone,Total and Weakly Bound w/ SHBG; Future  - Dehydroepiandrosterone Sulfate [E]; Future  - CELIAC DISEASE SCREEN Reflex; Future  - POC COVID19 BinaxNOW Antigen  - OBG Referral - In Network  - Thyroid Peroxidase (TPO) AB [E]; Future    6. Acute sinusitis, recurrence not specified, unspecified location  Zpcak   Medrol dose shruthi   - atomoxetine 25 MG Oral Cap; Take 1 capsule (25 mg total) by mouth daily. (Patient not taking: Reported on 9/17/2024)  - sertraline 50 MG Oral Tab; Take 1 tablet (50 mg total) by mouth daily. (Patient not taking: Reported on 9/17/2024)  - venlafaxine 37.5 MG Oral Tab; Take 1 tablet (37.5 mg total) by  mouth daily. (Patient not taking: Reported on 9/17/2024)  - CBC With Differential With Platelet; Future  - Comp Metabolic Panel (14); Future  - Lipid Panel; Future  - TSH W Reflex To Free T4; Future  - Vitamin D; Future  - Hemoglobin A1C; Future  - FSH [E]; Future  - Allergy Region 8; Future  - Testosterone,Total and Weakly Bound w/ SHBG; Future  - Dehydroepiandrosterone Sulfate [E]; Future  - CELIAC DISEASE SCREEN Reflex; Future  - POC COVID19 BinaxNOW Antigen  - OBG Referral - In Network  - Thyroid Peroxidase (TPO) AB [E]; Future  - azithromycin (ZITHROMAX Z-KATELYNN) 250 MG Oral Tab; Take 2 tablets (500 mg total) by mouth daily for 1 day, THEN 1 tablet (250 mg total) daily for 4 days.  Dispense: 6 tablet; Refill: 0    7. Acne, unspecified acne type  Testosterone level     8. Family history of thyroid disease  TSH   TPO    9-Gluten sensitivity : celiac panel          I spent 45 minutes at the day of the service seeing the patient, examination, reviewing labs, independently interpreting results, completing charting and counseling the patient and/or on coordination of care.  The diagnosis, prognosis, and general treatment was explained to the patient.     Please return to the clinic if you are having persistent symptoms. If worsening symptoms should go to the ER    Eric Diaz MD,   Diplomate of the American Board of Internal Medicine  Diplomate of the American Board of Obesity Medicine

## 2024-09-18 ENCOUNTER — LAB ENCOUNTER (OUTPATIENT)
Dept: LAB | Age: 39
End: 2024-09-18
Attending: INTERNAL MEDICINE
Payer: COMMERCIAL

## 2024-09-18 DIAGNOSIS — J02.9 SORE THROAT: ICD-10-CM

## 2024-09-18 DIAGNOSIS — Z83.49 FAMILY HISTORY OF THYROID DISEASE: ICD-10-CM

## 2024-09-18 DIAGNOSIS — K90.0: ICD-10-CM

## 2024-09-18 DIAGNOSIS — N92.6 IRREGULAR PERIODS: ICD-10-CM

## 2024-09-18 DIAGNOSIS — J06.9 URTI (ACUTE UPPER RESPIRATORY INFECTION): ICD-10-CM

## 2024-09-18 DIAGNOSIS — R42 DIZZINESS: ICD-10-CM

## 2024-09-18 DIAGNOSIS — R87.810 CERVICAL HIGH RISK HPV (HUMAN PAPILLOMAVIRUS) TEST POSITIVE: ICD-10-CM

## 2024-09-18 DIAGNOSIS — J01.90 ACUTE SINUSITIS, RECURRENCE NOT SPECIFIED, UNSPECIFIED LOCATION: ICD-10-CM

## 2024-09-18 LAB
ALBUMIN SERPL-MCNC: 4.5 G/DL (ref 3.2–4.8)
ALBUMIN/GLOB SERPL: 1.3 {RATIO} (ref 1–2)
ALP LIVER SERPL-CCNC: 61 U/L
ALT SERPL-CCNC: 41 U/L
ANION GAP SERPL CALC-SCNC: 8 MMOL/L (ref 0–18)
AST SERPL-CCNC: 39 U/L (ref ?–34)
BASOPHILS # BLD AUTO: 0.07 X10(3) UL (ref 0–0.2)
BASOPHILS NFR BLD AUTO: 1.4 %
BILIRUB SERPL-MCNC: 1.2 MG/DL (ref 0.3–1.2)
BUN BLD-MCNC: 11 MG/DL (ref 9–23)
BUN/CREAT SERPL: 10.3 (ref 10–20)
CALCIUM BLD-MCNC: 9.5 MG/DL (ref 8.7–10.4)
CHLORIDE SERPL-SCNC: 106 MMOL/L (ref 98–112)
CHOLEST SERPL-MCNC: 143 MG/DL (ref ?–200)
CO2 SERPL-SCNC: 26 MMOL/L (ref 21–32)
CREAT BLD-MCNC: 1.07 MG/DL
DEPRECATED RDW RBC AUTO: 43.8 FL (ref 35.1–46.3)
DHEA-S SERPL-MCNC: 233.2 UG/DL
EGFRCR SERPLBLD CKD-EPI 2021: 68 ML/MIN/1.73M2 (ref 60–?)
EOSINOPHIL # BLD AUTO: 0.22 X10(3) UL (ref 0–0.7)
EOSINOPHIL NFR BLD AUTO: 4.3 %
ERYTHROCYTE [DISTWIDTH] IN BLOOD BY AUTOMATED COUNT: 12.6 % (ref 11–15)
EST. AVERAGE GLUCOSE BLD GHB EST-MCNC: 100 MG/DL (ref 68–126)
FASTING PATIENT LIPID ANSWER: YES
FASTING STATUS PATIENT QL REPORTED: YES
FSH SERPL-ACNC: 26.4 MIU/ML
GLOBULIN PLAS-MCNC: 3.6 G/DL (ref 2–3.5)
GLUCOSE BLD-MCNC: 94 MG/DL (ref 70–99)
HBA1C MFR BLD: 5.1 % (ref ?–5.7)
HCT VFR BLD AUTO: 48.4 %
HDLC SERPL-MCNC: 34 MG/DL (ref 40–59)
HGB BLD-MCNC: 16.2 G/DL
IGA SERPL-MCNC: 209.4 MG/DL (ref 70–312)
IMM GRANULOCYTES # BLD AUTO: 0.04 X10(3) UL (ref 0–1)
IMM GRANULOCYTES NFR BLD: 0.8 %
LDLC SERPL CALC-MCNC: 81 MG/DL (ref ?–100)
LYMPHOCYTES # BLD AUTO: 1.59 X10(3) UL (ref 1–4)
LYMPHOCYTES NFR BLD AUTO: 31.2 %
MCH RBC QN AUTO: 31.5 PG (ref 26–34)
MCHC RBC AUTO-ENTMCNC: 33.5 G/DL (ref 31–37)
MCV RBC AUTO: 94.2 FL
MONOCYTES # BLD AUTO: 0.4 X10(3) UL (ref 0.1–1)
MONOCYTES NFR BLD AUTO: 7.8 %
NEUTROPHILS # BLD AUTO: 2.78 X10 (3) UL (ref 1.5–7.7)
NEUTROPHILS # BLD AUTO: 2.78 X10(3) UL (ref 1.5–7.7)
NEUTROPHILS NFR BLD AUTO: 54.5 %
NONHDLC SERPL-MCNC: 109 MG/DL (ref ?–130)
OSMOLALITY SERPL CALC.SUM OF ELEC: 289 MOSM/KG (ref 275–295)
PLATELET # BLD AUTO: 240 10(3)UL (ref 150–450)
POTASSIUM SERPL-SCNC: 4.2 MMOL/L (ref 3.5–5.1)
PROT SERPL-MCNC: 8.1 G/DL (ref 5.7–8.2)
RBC # BLD AUTO: 5.14 X10(6)UL
SODIUM SERPL-SCNC: 140 MMOL/L (ref 136–145)
THYROPEROXIDASE AB SERPL-ACNC: 66 U/ML (ref ?–60)
TRIGL SERPL-MCNC: 159 MG/DL (ref 30–149)
TSI SER-ACNC: 2.87 MIU/ML (ref 0.55–4.78)
VIT D+METAB SERPL-MCNC: 23.9 NG/ML (ref 30–100)
VLDLC SERPL CALC-MCNC: 25 MG/DL (ref 0–30)
WBC # BLD AUTO: 5.1 X10(3) UL (ref 4–11)

## 2024-09-18 PROCEDURE — 82627 DEHYDROEPIANDROSTERONE: CPT | Performed by: INTERNAL MEDICINE

## 2024-09-18 PROCEDURE — 80061 LIPID PANEL: CPT | Performed by: INTERNAL MEDICINE

## 2024-09-18 PROCEDURE — 86364 TISS TRNSGLTMNASE EA IG CLAS: CPT | Performed by: INTERNAL MEDICINE

## 2024-09-18 PROCEDURE — 86003 ALLG SPEC IGE CRUDE XTRC EA: CPT | Performed by: INTERNAL MEDICINE

## 2024-09-18 PROCEDURE — 84410 TESTOSTERONE BIOAVAILABLE: CPT | Performed by: INTERNAL MEDICINE

## 2024-09-18 PROCEDURE — 83001 ASSAY OF GONADOTROPIN (FSH): CPT | Performed by: INTERNAL MEDICINE

## 2024-09-18 PROCEDURE — 80050 GENERAL HEALTH PANEL: CPT | Performed by: INTERNAL MEDICINE

## 2024-09-18 PROCEDURE — 83036 HEMOGLOBIN GLYCOSYLATED A1C: CPT | Performed by: INTERNAL MEDICINE

## 2024-09-18 PROCEDURE — 82785 ASSAY OF IGE: CPT | Performed by: INTERNAL MEDICINE

## 2024-09-18 PROCEDURE — 86376 MICROSOMAL ANTIBODY EACH: CPT | Performed by: INTERNAL MEDICINE

## 2024-09-18 PROCEDURE — 82784 ASSAY IGA/IGD/IGG/IGM EACH: CPT | Performed by: INTERNAL MEDICINE

## 2024-09-18 PROCEDURE — 82306 VITAMIN D 25 HYDROXY: CPT | Performed by: INTERNAL MEDICINE

## 2024-09-19 LAB
A ALTERNATA IGE QN: <0.1 KUA/L (ref ?–0.1)
A FUMIGATUS IGE QN: <0.1 KUA/L (ref ?–0.1)
AMER SYCAMORE IGE QN: <0.1 KUA/L (ref ?–0.1)
BERMUDA GRASS IGE QN: <0.1 KUA/L (ref ?–0.1)
BOXELDER IGE QN: <0.1 KUA/L (ref ?–0.1)
C HERBARUM IGE QN: <0.1 KUA/L (ref ?–0.1)
CALIF WALNUT IGE QN: <0.1 KUA/L (ref ?–0.1)
CAT DANDER IGE QN: 0.46 KUA/L (ref ?–0.1)
CMN PIGWEED IGE QN: <0.1 KUA/L (ref ?–0.1)
COMMON RAGWEED IGE QN: <0.1 KUA/L (ref ?–0.1)
COTTONWOOD IGE QN: <0.1 KUA/L (ref ?–0.1)
D FARINAE IGE QN: <0.1 KUA/L (ref ?–0.1)
D PTERONYSS IGE QN: <0.1 KUA/L (ref ?–0.1)
DOG DANDER IGE QN: <0.1 KUA/L (ref ?–0.1)
IGE SERPL-ACNC: 97 KU/L (ref 2–214)
M RACEMOSUS IGE QN: <0.1 KUA/L (ref ?–0.1)
MARSH ELDER IGE QN: <0.1 KUA/L (ref ?–0.1)
MOUSE EPITH IGE QN: <0.1 KUA/L (ref ?–0.1)
MT JUNIPER IGE QN: <0.1 KUA/L (ref ?–0.1)
P NOTATUM IGE QN: <0.1 KUA/L (ref ?–0.1)
PECAN/HICK TREE IGE QN: <0.1 KUA/L (ref ?–0.1)
ROACH IGE QN: <0.1 KUA/L (ref ?–0.1)
SALTWORT IGE QN: <0.1 KUA/L (ref ?–0.1)
SILVER BIRCH IGE QN: <0.1 KUA/L (ref ?–0.1)
TIMOTHY IGE QN: <0.1 KUA/L (ref ?–0.1)
TTG IGA SER-ACNC: 0.3 U/ML (ref ?–7)
WHITE ASH IGE QN: <0.1 KUA/L (ref ?–0.1)
WHITE ELM IGE QN: <0.1 KUA/L (ref ?–0.1)
WHITE MULBERRY IGE QN: <0.1 KUA/L (ref ?–0.1)
WHITE OAK IGE QN: <0.1 KUA/L (ref ?–0.1)

## 2024-09-20 DIAGNOSIS — E55.9 VITAMIN D DEFICIENCY: Primary | ICD-10-CM

## 2024-09-20 DIAGNOSIS — R79.89 ABNORMAL CBC: ICD-10-CM

## 2024-09-20 DIAGNOSIS — R79.89 ELEVATED SERUM CREATININE: ICD-10-CM

## 2024-09-20 RX ORDER — ERGOCALCIFEROL 1.25 MG/1
50000 CAPSULE, LIQUID FILLED ORAL WEEKLY
Qty: 12 CAPSULE | Refills: 1 | Status: SHIPPED | OUTPATIENT
Start: 2024-09-20 | End: 2024-12-07

## 2024-09-24 LAB
SEX HORM BIND GLOB: 22.8 NMOL/L
TESTOST % FREE+WEAK BND: 21.6 %
TESTOST FREE+WEAK BND: 3.7 NG/DL
TESTOSTERONE TOT /MS: 17 NG/DL

## 2024-09-27 DIAGNOSIS — R79.89 ELEVATED TESTOSTERONE LEVEL: Primary | ICD-10-CM

## 2024-11-01 PROBLEM — J06.9 URTI (ACUTE UPPER RESPIRATORY INFECTION): Status: RESOLVED | Noted: 2024-09-17 | Resolved: 2024-11-01

## 2024-12-09 ENCOUNTER — LAB ENCOUNTER (OUTPATIENT)
Dept: LAB | Age: 39
End: 2024-12-09
Attending: INTERNAL MEDICINE
Payer: COMMERCIAL

## 2024-12-09 ENCOUNTER — APPOINTMENT (OUTPATIENT)
Dept: GENERAL RADIOLOGY | Age: 39
End: 2024-12-09
Attending: NURSE PRACTITIONER
Payer: COMMERCIAL

## 2024-12-09 ENCOUNTER — HOSPITAL ENCOUNTER (OUTPATIENT)
Age: 39
Discharge: HOME | End: 2024-12-09
Payer: COMMERCIAL

## 2024-12-09 VITALS
OXYGEN SATURATION: 95 % | SYSTOLIC BLOOD PRESSURE: 125 MMHG | HEART RATE: 92 BPM | TEMPERATURE: 99 F | RESPIRATION RATE: 20 BRPM | DIASTOLIC BLOOD PRESSURE: 74 MMHG

## 2024-12-09 DIAGNOSIS — J06.9 VIRAL URI: Primary | ICD-10-CM

## 2024-12-09 DIAGNOSIS — R79.89 ELEVATED TESTOSTERONE LEVEL: ICD-10-CM

## 2024-12-09 LAB
CORTIS SERPL-MCNC: 10 UG/DL
DHEA-S SERPL-MCNC: 222.9 UG/DL
FSH SERPL-ACNC: 4.9 MIU/ML
POCT INFLUENZA A: NEGATIVE
POCT INFLUENZA B: NEGATIVE
PROLACTIN SERPL-MCNC: 9.8 NG/ML
S PYO AG THROAT QL IA.RAPID: NEGATIVE
SARS-COV-2 RNA RESP QL NAA+PROBE: NOT DETECTED

## 2024-12-09 PROCEDURE — 99214 OFFICE O/P EST MOD 30 MIN: CPT

## 2024-12-09 PROCEDURE — 87502 INFLUENZA DNA AMP PROBE: CPT | Performed by: NURSE PRACTITIONER

## 2024-12-09 PROCEDURE — 87651 STREP A DNA AMP PROBE: CPT | Performed by: NURSE PRACTITIONER

## 2024-12-09 PROCEDURE — 84143 ASSAY OF 17-HYDROXYPREGNENO: CPT | Performed by: INTERNAL MEDICINE

## 2024-12-09 PROCEDURE — 82533 TOTAL CORTISOL: CPT | Performed by: INTERNAL MEDICINE

## 2024-12-09 PROCEDURE — 83001 ASSAY OF GONADOTROPIN (FSH): CPT | Performed by: INTERNAL MEDICINE

## 2024-12-09 PROCEDURE — 82627 DEHYDROEPIANDROSTERONE: CPT | Performed by: INTERNAL MEDICINE

## 2024-12-09 PROCEDURE — 71046 X-RAY EXAM CHEST 2 VIEWS: CPT | Performed by: NURSE PRACTITIONER

## 2024-12-09 PROCEDURE — 84146 ASSAY OF PROLACTIN: CPT | Performed by: INTERNAL MEDICINE

## 2024-12-09 RX ORDER — AZITHROMYCIN 250 MG/1
TABLET, FILM COATED ORAL
Qty: 6 TABLET | Refills: 0 | Status: SHIPPED | OUTPATIENT
Start: 2024-12-09 | End: 2024-12-14

## 2024-12-09 NOTE — ED INITIAL ASSESSMENT (HPI)
Patient with sore throat Friday am.  Now with increased cough.  Denies fevers, body aches.  Reports she has had bronchitis with similar symptoms and was treated with azithromycin.

## 2024-12-10 NOTE — ED PROVIDER NOTES
Patient Seen in: [facility] [place]      History     Chief Complaint   Patient presents with    Cough/URI     Stated Complaint: sore throat, stuffy nose, fever, cough    Subjective:   HPI    39-year-old female presents with sore throat sinus congestion fever and cough.      Objective:     History reviewed. No pertinent past medical history.           History reviewed. No pertinent surgical history.             No pertinent social history.            Review of Systems    Positive for stated complaint: sore throat, stuffy nose, fever, cough  Other systems are as noted in HPI.  Constitutional and vital signs reviewed.      All other systems reviewed and negative except as noted above.    Physical Exam     ED Triage Vitals [12/09/24 1209]   /74   Pulse 92   Resp 20   Temp 99.1 °F (37.3 °C)   Temp src Oral   SpO2 95 %   O2 Device None (Room air)       Current Vitals:   Vital Signs  BP: 125/74  Pulse: 92  Resp: 20  Temp: 99.1 °F (37.3 °C)  Temp src: Oral    Oxygen Therapy  SpO2: 95 %  O2 Device: None (Room air)        Physical Exam  Vitals reviewed.   Constitutional:       General: She is not in acute distress.     Appearance: She is not ill-appearing.   HENT:      Right Ear: Tympanic membrane, ear canal and external ear normal.      Left Ear: Tympanic membrane, ear canal and external ear normal.      Nose: Congestion present. No rhinorrhea.      Mouth/Throat:      Mouth: Mucous membranes are moist.      Pharynx: Posterior oropharyngeal erythema present.      Comments: PND  Cardiovascular:      Rate and Rhythm: Normal rate and regular rhythm.   Pulmonary:      Effort: Pulmonary effort is normal.      Breath sounds: Normal breath sounds.   Musculoskeletal:         General: Normal range of motion.      Cervical back: Normal range of motion and neck supple.   Lymphadenopathy:      Cervical: No cervical adenopathy.   Skin:     General: Skin is warm and dry.   Neurological:      General: No focal deficit present.       Mental Status: She is alert and oriented to person, place, and time.   Psychiatric:         Mood and Affect: Mood normal.         Behavior: Behavior normal.             ED Course     Labs Reviewed   RAPID STREP A - Normal   POCT FLU TEST - Normal    Narrative:     This assay is a rapid molecular in vitro test utilizing nucleic acid amplification of influenza A and B viral RNA.   RAPID SARS-COV-2 BY PCR - Normal                   MDM              Medical Decision Making  39-year-old female presents with sore throat cough and congestion.  Differential diagnosis includes viral upper respiratory infection, pneumonia, COVID, influenza, strep.  Patient does have some nasal congestion.  POC COVID is negative.  POC influenza is negative.  POC strep is negative.  Chest x-ray was done and shows no pneumonia.  All results were discussed with patient.  She states that she is a \"heart patient \"and is always given Zithromax to help with the symptoms.  Patient was informed that this illness is likely viral.  Patient was insistent so prescription for Zithromax was sent to her pharmacy.  She was given printed instructions.    Amount and/or Complexity of Data Reviewed  Labs: ordered.     Details: POC covid is negative  POC influenza si neg  POC strep is negative  Radiology: ordered.     Details: CXR reviewed by IC provider. Shows neg PNA    Risk  OTC drugs.  Prescription drug management.        Disposition and Plan     Clinical Impression:  1. Viral URI         Disposition:  Discharge  12/9/2024 12:44 pm    Follow-up:  Eric Diaz MD  [address] [place] [id number]  [phone]      If symptoms worsen          Medications Prescribed:  Discharge Medication List as of 12/9/2024 12:46 PM              Supplementary Documentation:

## 2024-12-13 LAB — 17-OH PROGESTERONE: 20 NG/DL

## 2025-01-21 ENCOUNTER — OFFICE VISIT (OUTPATIENT)
Dept: INTERNAL MEDICINE CLINIC | Facility: CLINIC | Age: 40
End: 2025-01-21
Payer: COMMERCIAL

## 2025-01-21 VITALS
HEART RATE: 99 BPM | DIASTOLIC BLOOD PRESSURE: 70 MMHG | WEIGHT: 217.81 LBS | OXYGEN SATURATION: 94 % | BODY MASS INDEX: 31.18 KG/M2 | HEIGHT: 70 IN | SYSTOLIC BLOOD PRESSURE: 118 MMHG

## 2025-01-21 DIAGNOSIS — J01.90 ACUTE SINUSITIS, RECURRENCE NOT SPECIFIED, UNSPECIFIED LOCATION: ICD-10-CM

## 2025-01-21 DIAGNOSIS — N17.9 AKI (ACUTE KIDNEY INJURY): ICD-10-CM

## 2025-01-21 DIAGNOSIS — E66.9 OBESITY (BMI 30-39.9): Primary | ICD-10-CM

## 2025-01-21 DIAGNOSIS — E28.2 PCOS (POLYCYSTIC OVARIAN SYNDROME): ICD-10-CM

## 2025-01-21 DIAGNOSIS — E06.3 HASHIMOTO'S THYROIDITIS: ICD-10-CM

## 2025-01-21 PROCEDURE — 3078F DIAST BP <80 MM HG: CPT | Performed by: INTERNAL MEDICINE

## 2025-01-21 PROCEDURE — 3008F BODY MASS INDEX DOCD: CPT | Performed by: INTERNAL MEDICINE

## 2025-01-21 PROCEDURE — 99214 OFFICE O/P EST MOD 30 MIN: CPT | Performed by: INTERNAL MEDICINE

## 2025-01-21 PROCEDURE — 3074F SYST BP LT 130 MM HG: CPT | Performed by: INTERNAL MEDICINE

## 2025-01-21 RX ORDER — METFORMIN HYDROCHLORIDE 750 MG/1
750 TABLET, EXTENDED RELEASE ORAL
Qty: 90 TABLET | Refills: 1 | Status: SHIPPED | OUTPATIENT
Start: 2025-01-21 | End: 2025-04-21

## 2025-01-21 RX ORDER — CETIRIZINE HYDROCHLORIDE, PSEUDOEPHEDRINE HYDROCHLORIDE 5; 120 MG/1; MG/1
1 TABLET, FILM COATED, EXTENDED RELEASE ORAL 2 TIMES DAILY
Qty: 14 TABLET | Refills: 0 | Status: SHIPPED | OUTPATIENT
Start: 2025-01-21

## 2025-01-21 RX ORDER — AZITHROMYCIN 250 MG/1
TABLET, FILM COATED ORAL
Qty: 6 TABLET | Refills: 0 | Status: SHIPPED | OUTPATIENT
Start: 2025-01-21 | End: 2025-01-26

## 2025-01-21 RX ORDER — FLUTICASONE PROPIONATE 50 MCG
2 SPRAY, SUSPENSION (ML) NASAL DAILY
Qty: 11.1 ML | Refills: 0 | Status: SHIPPED | OUTPATIENT
Start: 2025-01-21

## 2025-01-21 NOTE — PROGRESS NOTES
Evangelina Timmons is a 39 year old female.    Chief complaint: sinus congestion       HPI:     Evangelina Timmons is a 39 year old pleasant female who presents for sinus congestion    Sinus issues started October   Sinus congestion   She cannot breath out of her nose   Clear nasal discharge   No green nasal discharge   No fever or chills   No cough   Hasnot tried anything over the counter   She tries not to take anything   Hadnt seen an ENT before           She has a therapist   She had a psychiatrist   She is seeing the new psychiatrist       Obesity   Doesn't exercise regularly   Would like to start weight loss medication         ASD   Open heart surgery         Elevated testosterone level           Current Outpatient Medications   Medication Sig Dispense Refill    atomoxetine 25 MG Oral Cap Take 1 capsule (25 mg total) by mouth daily. (Patient not taking: Reported on 1/21/2025)      sertraline 50 MG Oral Tab Take 1 tablet (50 mg total) by mouth daily. (Patient not taking: Reported on 1/21/2025)      venlafaxine 37.5 MG Oral Tab Take 1 tablet (37.5 mg total) by mouth daily. (Patient not taking: Reported on 1/21/2025)      methylPREDNISolone (MEDROL) 4 MG Oral Tablet Therapy Pack As directed. (Patient not taking: Reported on 1/21/2025) 21 each 0    Levonorgestrel 20 MCG/DAY Intrauterine IUD 1 each by Intrauterine route continuous. (Patient not taking: Reported on 1/21/2025)        No past medical history on file.  No past surgical history on file.        No family history on file.  Patient Active Problem List   Diagnosis    Atrial septal defect (HCC)    High risk HPV infection    Pain of left breast    Acute sinusitis    Cervical high risk HPV (human papillomavirus) test positive    Sore throat    Dizziness    Irregular periods    Acne       REVIEW OF SYSTEMS:   A comprehensive 10 point review of systems was completed.  Pertinent positives and negatives noted in the the HPI            EXAM:   /70    Pulse 99   Ht 5' 10\" (1.778 m)   Wt 217 lb 12.8 oz (98.8 kg)   SpO2 94%   BMI 31.25 kg/m²   GENERAL: congested   HEENT: fluids behind the TM   LUNGS: clear to auscultation  CARDIO: RRR without murmur             No orders of the defined types were placed in this encounter.    XR CHEST PA + LAT CHEST (CPT=71046)    Result Date: 12/9/2024  CONCLUSION: No acute cardiopulmonary disease.    Dictated by (CST): Pepe Abarca MD on 12/09/2024 at 12:39 PM     Finalized by (CST): Pepe Abarca MD on 12/09/2024 at 12:41 PM                ASSESSMENT AND PLAN:     1. ZUHAIR (acute kidney injury) (HCC)  Advised good hydration   Repeat BMP along with UA and microalbumin     - CBC With Differential With Platelet; Future  - Basic Metabolic Panel (8) [E]; Future  - TSH W Reflex To Free T4; Future  - Testosterone,Total and Weakly Bound w/ SHBG; Future  - Urinalysis with Culture Reflex [E]; Future  - Microalb/Creat Ratio, Random Urine [E]; Future  - cetirizine-pseudoephedrine ER 5-120 MG Oral Tablet 12 Hr; Take 1 tablet by mouth 2 (two) times daily.  Dispense: 14 tablet; Refill: 0  - azithromycin (ZITHROMAX Z-KATELYNN) 250 MG Oral Tab; Take 2 tablets (500 mg total) by mouth daily for 1 day, THEN 1 tablet (250 mg total) daily for 4 days.  Dispense: 6 tablet; Refill: 0  - fluticasone propionate 50 MCG/ACT Nasal Suspension; 2 sprays by Each Nare route daily.  Dispense: 11.1 mL; Refill: 0  - ENT Referral - In Network    2. Acute sinusitis, recurrence not specified, unspecified location  [facility]   Flonase   ENT referral since recurrent / chronic   - cetirizine-pseudoephedrine ER 5-120 MG Oral Tablet 12 Hr; Take 1 tablet by mouth 2 (two) times daily.  Dispense: 14 tablet; Refill: 0  - azithromycin (ZITHROMAX Z-KATELYNN) 250 MG Oral Tab; Take 2 tablets (500 mg total) by mouth daily for 1 day, THEN 1 tablet (250 mg total) daily for 4 days.  Dispense: 6 tablet; Refill: 0  - fluticasone propionate 50 MCG/ACT Nasal Suspension; 2 sprays  by Each Nare route daily.  Dispense: 11.1 mL; Refill: 0  - ENT Referral - In Network    3. Obesity (BMI 30-39.9)  Cut down carbs to less than 100 g per day   Increase protein   Starting metformin off label for weight loss and PCos   Goal weight loss is 10 lbs in 3 months   Advised to check with insurance if they cover zepbound or wegovy     4. PCOS (polycystic ovarian syndrome)  Metformin   Discussed side effects       5. Hashimoto's thyroiditis  Repeat TSH level      Please return to the clinic if you are having persistent symptoms. If worsening symptoms should go to the ER    Eric Diaz MD,   Diplomate of the American Board of Internal Medicine  Diplomate of the American Board of Obesity Medicine

## 2025-02-14 ENCOUNTER — TELEPHONE (OUTPATIENT)
Dept: OBGYN | Age: 40
End: 2025-02-14

## 2025-06-17 ENCOUNTER — OFFICE VISIT (OUTPATIENT)
Dept: INTERNAL MEDICINE CLINIC | Facility: CLINIC | Age: 40
End: 2025-06-17
Payer: COMMERCIAL

## 2025-06-17 VITALS
SYSTOLIC BLOOD PRESSURE: 116 MMHG | BODY MASS INDEX: 30.15 KG/M2 | HEART RATE: 97 BPM | DIASTOLIC BLOOD PRESSURE: 78 MMHG | OXYGEN SATURATION: 94 % | HEIGHT: 70 IN | WEIGHT: 210.63 LBS

## 2025-06-17 DIAGNOSIS — R10.9 ABDOMINAL PAIN, UNSPECIFIED ABDOMINAL LOCATION: ICD-10-CM

## 2025-06-17 DIAGNOSIS — N17.9 AKI (ACUTE KIDNEY INJURY): ICD-10-CM

## 2025-06-17 DIAGNOSIS — Z00.00 ANNUAL PHYSICAL EXAM: Primary | ICD-10-CM

## 2025-06-17 DIAGNOSIS — E04.9 ENLARGED THYROID: ICD-10-CM

## 2025-06-17 DIAGNOSIS — N92.6 IRREGULAR PERIODS: ICD-10-CM

## 2025-06-17 DIAGNOSIS — E28.2 PCOS (POLYCYSTIC OVARIAN SYNDROME): ICD-10-CM

## 2025-06-17 DIAGNOSIS — Z23 NEED FOR VACCINATION: ICD-10-CM

## 2025-06-17 RX ORDER — METFORMIN HYDROCHLORIDE 750 MG/1
TABLET, EXTENDED RELEASE ORAL
COMMUNITY
Start: 2025-04-27

## 2025-06-17 NOTE — PROGRESS NOTES
Evangelina Timmons is a 39 year old female.    Chief complaint: annual physical exam       HPI:     Evangelina Timmons is a 39 year old female who presents for annual physical exam   PCOS   Taking metformin       Suspected ADD   She is seeing a therapist   Not taking sertraline       She feels abdominal pain mid abdomen   When she eats   More with greasy food  No black stool or blood in the stool           No smoking   Never been a smoker   Alcohol : a glass 3 times per week       Exercise : no   Family history of cancer: dad  of cancer liver cancer that went to the brain         Current Medications[1]   Past Medical History[2]  Past Surgical History[3]        Family History[4]  Problem List[5]    REVIEW OF SYSTEMS:   A comprehensive 10 point review of systems was completed.  Pertinent positives and negatives noted in the the HPI            EXAM:   /78   Pulse 97   Ht 5' 10\" (1.778 m)   Wt 210 lb 9.6 oz (95.5 kg)   SpO2 94%   BMI 30.22 kg/m²   GENERAL: well developed, well nourished,in no apparent distress  SKIN: no rashes,no suspicious lesions  HEENT: atraumatic, normocephalic,ears and throat are clear  NECK: enlarged thyroid   LUNGS: clear to auscultation  CARDIO: RRR without murmur  GI:  no acute abdominal signs   EXTREMITIES: no cyanosis, clubbing or edema  NEURO: no gross deficits              Orders Placed This Encounter    metFORMIN  MG Oral Tablet 24 Hr     No results found.         ASSESSMENT AND PLAN:     1. Annual physical exam  Diet and exercise   Self breast exam   Sun screen recommended   Fasting blood work   Tdap today   - metFORMIN  MG Oral Tablet 24 Hr  - CBC With Differential With Platelet; Future  - Comp Metabolic Panel (14) [E]; Future  - Testosterone,Total and Weakly Bound w/ SHBG; Future  - TSH W Reflex To Free T4; Future  - Urinalysis with Culture Reflex [E]; Future  - Microalb/Creat Ratio, Random Urine [E]; Future  - TdaP (Adacel, Boostrix) [25935]  -  Lipid Panel [E]; Future  - DIETITIAN EDUCATION INITIAL, DIET (INTERNAL)  - US ABDOMEN LIMITED (CPT=76705); Future  - H. Pylori Stool Ag, EIA [E]; Future  - Sed Rate, Westergren (Automated); Future  - C-Reactive Protein [E]; Future  - US THYROID (CPT=76536); Future    2. Enlarged thyroid E/M  Advised to have us thyroid   - metFORMIN  MG Oral Tablet 24 Hr  - CBC With Differential With Platelet; Future  - Comp Metabolic Panel (14) [E]; Future  - Testosterone,Total and Weakly Bound w/ SHBG; Future  - TSH W Reflex To Free T4; Future  - Urinalysis with Culture Reflex [E]; Future  - Microalb/Creat Ratio, Random Urine [E]; Future  - TdaP (Adacel, Boostrix) [42107]  - Lipid Panel [E]; Future  - DIETITIAN EDUCATION INITIAL, DIET (INTERNAL)  - US ABDOMEN LIMITED (CPT=76705); Future  - H. Pylori Stool Ag, EIA [E]; Future  - Sed Rate, Westergren (Automated); Future  - C-Reactive Protein [E]; Future  - US THYROID (CPT=76536); Future    3. ZUHAIR (acute kidney injury) E/M  Cmp  UA   Microalbumin   Advised good hydration   - metFORMIN  MG Oral Tablet 24 Hr  - CBC With Differential With Platelet; Future  - Comp Metabolic Panel (14) [E]; Future  - Testosterone,Total and Weakly Bound w/ SHBG; Future  - TSH W Reflex To Free T4; Future  - Urinalysis with Culture Reflex [E]; Future  - Microalb/Creat Ratio, Random Urine [E]; Future  - TdaP (Adacel, Boostrix) [73923]  - Lipid Panel [E]; Future  - DIETITIAN EDUCATION INITIAL, DIET (INTERNAL)  - US ABDOMEN LIMITED (CPT=76705); Future  - H. Pylori Stool Ag, EIA [E]; Future  - Sed Rate, Westergren (Automated); Future  - C-Reactive Protein [E]; Future  - US THYROID (CPT=76536); Future    4. PCOS (polycystic ovarian syndrome)  Discussed PCOS treatment and expectations   She prefers to discuss with OB gyne first     - metFORMIN  MG Oral Tablet 24 Hr  - CBC With Differential With Platelet; Future  - Comp Metabolic Panel (14) [E]; Future  - Testosterone,Total and Weakly Bound w/  SHBG; Future  - TSH W Reflex To Free T4; Future  - Urinalysis with Culture Reflex [E]; Future  - Microalb/Creat Ratio, Random Urine [E]; Future  - TdaP (Adacel, Boostrix) [[id number]]  - Lipid Panel [E]; Future  - DIETITIAN EDUCATION INITIAL, DIET (INTERNAL)  - US ABDOMEN LIMITED (CPT=76705); Future  - H. Pylori Stool Ag, EIA [E]; Future  - Sed Rate, Westergren (Automated); Future  - C-Reactive Protein [E]; Future  - US THYROID (CPT=76536); Future    5. Irregular periods  Follow up with OB gyne     - metFORMIN  MG Oral Tablet 24 Hr  - CBC With Differential With Platelet; Future  - Comp Metabolic Panel (14) [E]; Future  - Testosterone,Total and Weakly Bound w/ SHBG; Future  - TSH W Reflex To Free T4; Future  - Urinalysis with Culture Reflex [E]; Future  - Microalb/Creat Ratio, Random Urine [E]; Future  - TdaP (Adacel, Boostrix) [11284]  - Lipid Panel [E]; Future  - DIETITIAN EDUCATION INITIAL, DIET (INTERNAL)  - US ABDOMEN LIMITED (CPT=76705); Future  - H. Pylori Stool Ag, EIA [E]; Future  - Sed Rate, Westergren (Automated); Future  - C-Reactive Protein [E]; Future  - US THYROID (CPT=76536); Future    6. Need for vaccination  Tdap   - metFORMIN  MG Oral Tablet 24 Hr  - CBC With Differential With Platelet; Future  - Comp Metabolic Panel (14) [E]; Future  - Testosterone,Total and Weakly Bound w/ SHBG; Future  - TSH W Reflex To Free T4; Future  - Urinalysis with Culture Reflex [E]; Future  - Microalb/Creat Ratio, Random Urine [E]; Future  - TdaP (Adacel, Boostrix) [[id number]]  - Lipid Panel [E]; Future  - DIETITIAN EDUCATION INITIAL, DIET (INTERNAL)  - US ABDOMEN LIMITED (CPT=76705); Future  - H. Pylori Stool Ag, EIA [E]; Future  - Sed Rate, Westergren (Automated); Future  - C-Reactive Protein [E]; Future  - US THYROID (CPT=76536); Future    7. Abdominal pain, unspecified abdominal location E/M  Cbc , cmp,crp, esr   US Abdomen   H pylori   Consider CT scan if unexplained of alarming signs on exam     - metFORMIN ER  750 MG Oral Tablet 24 Hr  - CBC With Differential With Platelet; Future  - Comp Metabolic Panel (14) [E]; Future  - Testosterone,Total and Weakly Bound w/ SHBG; Future  - TSH W Reflex To Free T4; Future  - Urinalysis with Culture Reflex [E]; Future  - Microalb/Creat Ratio, Random Urine [E]; Future  - TdaP (Adacel, Boostrix) [[id number]]  - Lipid Panel [E]; Future  - DIETITIAN EDUCATION INITIAL, DIET (INTERNAL)  - US ABDOMEN LIMITED (CPT=76705); Future  - H. Pylori Stool Ag, EIA [E]; Future  - Sed Rate, Westergren (Automated); Future  - C-Reactive Protein [E]; Future  - US THYROID (CPT=76536); Future     Discussed E/M code + Z 00 code since with other complaints / work up     Please return to the clinic if you are having persistent symptoms. If worsening symptoms should go to the ER    Eric Diaz MD,   Diplomate of the American Board of Internal Medicine  Diplomate of the American Board of Obesity Medicine          [1]   Current Outpatient Medications   Medication Sig Dispense Refill    metFORMIN  MG Oral Tablet 24 Hr       cetirizine-pseudoephedrine ER 5-120 MG Oral Tablet 12 Hr Take 1 tablet by mouth 2 (two) times daily. (Patient not taking: Reported on 6/17/2025) 14 tablet 0    fluticasone propionate 50 MCG/ACT Nasal Suspension 2 sprays by Each Nare route daily. (Patient not taking: Reported on 6/17/2025) 11.1 mL 0    atomoxetine 25 MG Oral Cap Take 1 capsule (25 mg total) by mouth daily. (Patient not taking: Reported on 6/17/2025)      sertraline 50 MG Oral Tab Take 1 tablet (50 mg total) by mouth daily. (Patient not taking: Reported on 6/17/2025)      venlafaxine 37.5 MG Oral Tab Take 1 tablet (37.5 mg total) by mouth daily. (Patient not taking: Reported on 6/17/2025)      methylPREDNISolone (MEDROL) 4 MG Oral Tablet Therapy Pack As directed. (Patient not taking: Reported on 6/17/2025) 21 each 0    Levonorgestrel 20 MCG/DAY Intrauterine IUD 1 each by Intrauterine route continuous. (Patient not taking:  Reported on 6/17/2025)     [2] No past medical history on file.  [3] No past surgical history on file.  [4] No family history on file.  [5]   Patient Active Problem List  Diagnosis    Atrial septal defect (HCC)    High risk HPV infection    Pain of left breast    Acute sinusitis    Cervical high risk HPV (human papillomavirus) test positive    Sore throat    Dizziness    Irregular periods    Acne    PCOS (polycystic ovarian syndrome)    Obesity (BMI 30-39.9)    ZUHAIR (acute kidney injury)

## 2025-06-23 ENCOUNTER — APPOINTMENT (OUTPATIENT)
Dept: OBGYN | Age: 40
End: 2025-06-23

## 2025-06-23 VITALS
SYSTOLIC BLOOD PRESSURE: 137 MMHG | HEIGHT: 69 IN | WEIGHT: 210.21 LBS | OXYGEN SATURATION: 93 % | HEART RATE: 88 BPM | BODY MASS INDEX: 31.13 KG/M2 | TEMPERATURE: 97.6 F | DIASTOLIC BLOOD PRESSURE: 88 MMHG

## 2025-06-23 DIAGNOSIS — Z12.31 ENCOUNTER FOR SCREENING MAMMOGRAM FOR MALIGNANT NEOPLASM OF BREAST: ICD-10-CM

## 2025-06-23 DIAGNOSIS — Z01.419 ENCOUNTER FOR WELL WOMAN EXAM WITH ROUTINE GYNECOLOGICAL EXAM: Primary | ICD-10-CM

## 2025-06-23 PROCEDURE — 99395 PREV VISIT EST AGE 18-39: CPT | Performed by: OBSTETRICS & GYNECOLOGY

## 2025-06-23 RX ORDER — METFORMIN HYDROCHLORIDE 750 MG/1
TABLET, EXTENDED RELEASE ORAL
COMMUNITY
Start: 2025-04-27

## 2025-06-23 ASSESSMENT — PATIENT HEALTH QUESTIONNAIRE - PHQ9
CLINICAL INTERPRETATION OF PHQ2 SCORE: NO FURTHER SCREENING NEEDED
SUM OF ALL RESPONSES TO PHQ9 QUESTIONS 1 AND 2: 2
2. FEELING DOWN, DEPRESSED OR HOPELESS: SEVERAL DAYS
1. LITTLE INTEREST OR PLEASURE IN DOING THINGS: SEVERAL DAYS
SUM OF ALL RESPONSES TO PHQ9 QUESTIONS 1 AND 2: 2

## 2025-07-01 LAB
CASE RPRT: NORMAL
CLINICAL INFO: NORMAL
CYTOLOGY CVX/VAG STUDY: NORMAL
HPV16+18+45 E6+E7MRNA CVX NAA+PROBE: NEGATIVE
PAP EDUCATIONAL NOTE: NORMAL
SERVICE CMNT-IMP: NORMAL
STAT OF ADQ CVX/VAG CYTO-IMP: NORMAL

## 2025-08-01 DIAGNOSIS — E28.2 PCOS (POLYCYSTIC OVARIAN SYNDROME): ICD-10-CM

## 2025-08-01 DIAGNOSIS — N17.9 AKI (ACUTE KIDNEY INJURY): ICD-10-CM

## 2025-08-01 DIAGNOSIS — E04.9 ENLARGED THYROID: ICD-10-CM

## 2025-08-01 DIAGNOSIS — N92.6 IRREGULAR PERIODS: ICD-10-CM

## 2025-08-01 DIAGNOSIS — Z23 NEED FOR VACCINATION: ICD-10-CM

## 2025-08-01 DIAGNOSIS — R10.9 ABDOMINAL PAIN, UNSPECIFIED ABDOMINAL LOCATION: ICD-10-CM

## 2025-08-01 DIAGNOSIS — Z00.00 ANNUAL PHYSICAL EXAM: ICD-10-CM

## 2025-08-04 RX ORDER — METFORMIN HYDROCHLORIDE 750 MG/1
750 TABLET, EXTENDED RELEASE ORAL
Qty: 90 TABLET | Refills: 0 | OUTPATIENT
Start: 2025-08-04

## 2025-08-04 RX ORDER — METFORMIN HYDROCHLORIDE 750 MG/1
750 TABLET, EXTENDED RELEASE ORAL
Qty: 90 TABLET | Refills: 1 | Status: SHIPPED | OUTPATIENT
Start: 2025-08-04

## 2025-08-18 ENCOUNTER — OFFICE VISIT (OUTPATIENT)
Dept: OTOLARYNGOLOGY | Facility: CLINIC | Age: 40
End: 2025-08-18

## 2025-08-18 VITALS — HEIGHT: 70 IN | BODY MASS INDEX: 30.06 KG/M2 | WEIGHT: 210 LBS

## 2025-08-18 DIAGNOSIS — J30.9 ALLERGIC RHINITIS, UNSPECIFIED SEASONALITY, UNSPECIFIED TRIGGER: ICD-10-CM

## 2025-08-18 DIAGNOSIS — J32.9 CHRONIC SINUSITIS, UNSPECIFIED LOCATION: Primary | ICD-10-CM

## (undated) DEVICE — GOWN SURG LG L3 NONREINFORCE SET IN SLV STRL LF DISP BLUE

## (undated) DEVICE — GLOVE SURG 7.5 PROTEXIS LF BLUE PF SMTH BEAD CUFF INTLK STRL

## (undated) DEVICE — Device

## (undated) DEVICE — PAD DRSG 8X3IN CRD POLY CTN ABS PERFORATE FILM LF STRL

## (undated) DEVICE — GOWN SURG XL L3 NONREINFORCE SET IN SLV STRL LF DISP BLUE

## (undated) DEVICE — GLOVE SURG 6 PROTEXIS LF CRM PF BEAD CUFF STRL PLISPRN 11.5

## (undated) DEVICE — DRAPE UNDER BUTT FLTR SCRN FL CNTRL POUCH DRN PORT GRAD

## (undated) DEVICE — DRAPE FLUID COLLECTION POUCH SCT PORT GRAD MARK ADH CLSR OB

## (undated) DEVICE — CONTAINER SPEC 4OZ 2.5X2.75IN OR POS INDCTR TMPR EVD LEAK

## (undated) DEVICE — KIT FLUID MGMT BAG TISS TRAP FLUENT FLOPAK DISP

## (undated) DEVICE — LUBRICANT INST 2OZ SRGLB BCTRST FLPTP CAP STRL

## (undated) DEVICE — GLOVE SURG 7 PROTEXIS LF BLUE PF SMTH BEAD CUFF INTLK STRL

## (undated) DEVICE — GLOVE SURG 7 PROTEXIS LF CRM PF BEAD CUFF STRL PLISPRN 12IN